# Patient Record
Sex: MALE | Race: WHITE | NOT HISPANIC OR LATINO | Employment: FULL TIME | URBAN - METROPOLITAN AREA
[De-identification: names, ages, dates, MRNs, and addresses within clinical notes are randomized per-mention and may not be internally consistent; named-entity substitution may affect disease eponyms.]

---
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
## 2021-05-29 ENCOUNTER — RECORDS - HEALTHEAST (OUTPATIENT)
Dept: ADMINISTRATIVE | Facility: CLINIC | Age: 21
End: 2021-05-29

## 2022-08-23 NOTE — PROGRESS NOTES
Update Note:    Notified by nursing staff that mother was concerned that the swelling and redness was worse during and after bursa aspiration today with orthopedics. Juan M rates the pain as 3-4 and endorses some sweating/chills intermittently today.    He remains afebrile today.    Examination of left arm as below. Swelling extending along the forearm with faint overlying erythema extending beyond the ED line of demarcation to the new demarcated line. Range of motion is between 15 degrees and 145 degrees (similar to this morning). Notable warmth over elbow.    Gram stain from aspirated fluid revealed gram positive cocci in clusters.    Plan  1. Will continue IV vancomycin for today as this has great gram positive coverage, including MRSA.  2. Will continue assessing left arm clinically for signs of progressing erythema/swelling or improvement. It is likely the change in swelling is positional and secondary to the infection settling.        Goldie Bryson MD  [facility], PGY-1  Pager: [phone]   For information on Fall & Injury Prevention, visit: [email or web address] OR  [email or web address] OR  https://www.cdc.gov/steadi/patient.html

## 2023-08-21 ENCOUNTER — HOSPITAL ENCOUNTER (EMERGENCY)
Facility: CLINIC | Age: 23
Discharge: HOME | End: 2023-08-21
Attending: EMERGENCY MEDICINE | Admitting: EMERGENCY MEDICINE
Payer: COMMERCIAL

## 2023-08-21 VITALS
HEIGHT: 71 IN | OXYGEN SATURATION: 99 % | HEART RATE: 52 BPM | DIASTOLIC BLOOD PRESSURE: 93 MMHG | TEMPERATURE: 98.1 F | BODY MASS INDEX: 25.2 KG/M2 | RESPIRATION RATE: 17 BRPM | WEIGHT: 180 LBS | SYSTOLIC BLOOD PRESSURE: 143 MMHG

## 2023-08-21 DIAGNOSIS — K29.00 ACUTE GASTRITIS WITHOUT HEMORRHAGE, UNSPECIFIED GASTRITIS TYPE: ICD-10-CM

## 2023-08-21 DIAGNOSIS — R10.13 ABDOMINAL PAIN, EPIGASTRIC: ICD-10-CM

## 2023-08-21 LAB
ANION GAP SERPL CALCULATED.3IONS-SCNC: 11 MMOL/L (ref 7–15)
BASOPHILS # BLD AUTO: 0.1 10E3/UL (ref 0–0.2)
BASOPHILS NFR BLD AUTO: 1 %
BUN SERPL-MCNC: 20.5 MG/DL (ref 6–20)
CALCIUM SERPL-MCNC: 9.9 MG/DL (ref 8.6–10)
CHLORIDE SERPL-SCNC: 102 MMOL/L (ref 98–107)
CREAT SERPL-MCNC: 1.09 MG/DL (ref 0.67–1.17)
DEPRECATED HCO3 PLAS-SCNC: 26 MMOL/L (ref 22–29)
EOSINOPHIL # BLD AUTO: 0.3 10E3/UL (ref 0–0.7)
EOSINOPHIL NFR BLD AUTO: 3 %
ERYTHROCYTE [DISTWIDTH] IN BLOOD BY AUTOMATED COUNT: 12.2 % (ref 10–15)
GFR SERPL CREATININE-BSD FRML MDRD: >90 ML/MIN/1.73M2
GLUCOSE SERPL-MCNC: 106 MG/DL (ref 70–99)
HCT VFR BLD AUTO: 43.2 % (ref 40–53)
HGB BLD-MCNC: 15.3 G/DL (ref 13.3–17.7)
HOLD SPECIMEN: NORMAL
IMM GRANULOCYTES # BLD: 0 10E3/UL
IMM GRANULOCYTES NFR BLD: 0 %
LYMPHOCYTES # BLD AUTO: 3.1 10E3/UL (ref 0.8–5.3)
LYMPHOCYTES NFR BLD AUTO: 27 %
MCH RBC QN AUTO: 28.6 PG (ref 26.5–33)
MCHC RBC AUTO-ENTMCNC: 35.4 G/DL (ref 31.5–36.5)
MCV RBC AUTO: 81 FL (ref 78–100)
MONOCYTES # BLD AUTO: 0.8 10E3/UL (ref 0–1.3)
MONOCYTES NFR BLD AUTO: 7 %
NEUTROPHILS # BLD AUTO: 7.3 10E3/UL (ref 1.6–8.3)
NEUTROPHILS NFR BLD AUTO: 62 %
NRBC # BLD AUTO: 0 10E3/UL
NRBC BLD AUTO-RTO: 0 /100
PLATELET # BLD AUTO: 271 10E3/UL (ref 150–450)
POTASSIUM SERPL-SCNC: 3.8 MMOL/L (ref 3.4–5.3)
RBC # BLD AUTO: 5.35 10E6/UL (ref 4.4–5.9)
SODIUM SERPL-SCNC: 139 MMOL/L (ref 136–145)
TROPONIN T SERPL HS-MCNC: <6 NG/L
WBC # BLD AUTO: 11.6 10E3/UL (ref 4–11)

## 2023-08-21 PROCEDURE — 85025 COMPLETE CBC W/AUTO DIFF WBC: CPT | Performed by: EMERGENCY MEDICINE

## 2023-08-21 PROCEDURE — 99284 EMERGENCY DEPT VISIT MOD MDM: CPT | Performed by: EMERGENCY MEDICINE

## 2023-08-21 PROCEDURE — 36415 COLL VENOUS BLD VENIPUNCTURE: CPT | Performed by: EMERGENCY MEDICINE

## 2023-08-21 PROCEDURE — 93005 ELECTROCARDIOGRAM TRACING: CPT | Performed by: EMERGENCY MEDICINE

## 2023-08-21 PROCEDURE — 250N000013 HC RX MED GY IP 250 OP 250 PS 637: Performed by: EMERGENCY MEDICINE

## 2023-08-21 PROCEDURE — 84484 ASSAY OF TROPONIN QUANT: CPT | Performed by: EMERGENCY MEDICINE

## 2023-08-21 PROCEDURE — 93010 ELECTROCARDIOGRAM REPORT: CPT | Performed by: EMERGENCY MEDICINE

## 2023-08-21 PROCEDURE — 80048 BASIC METABOLIC PNL TOTAL CA: CPT | Performed by: EMERGENCY MEDICINE

## 2023-08-21 RX ORDER — MAGNESIUM HYDROXIDE/ALUMINUM HYDROXICE/SIMETHICONE 120; 1200; 1200 MG/30ML; MG/30ML; MG/30ML
30 SUSPENSION ORAL ONCE
Status: COMPLETED | OUTPATIENT
Start: 2023-08-21 | End: 2023-08-21

## 2023-08-21 RX ORDER — FAMOTIDINE 20 MG/1
40 TABLET, FILM COATED ORAL ONCE
Status: COMPLETED | OUTPATIENT
Start: 2023-08-21 | End: 2023-08-21

## 2023-08-21 RX ORDER — FAMOTIDINE 20 MG/1
40 TABLET, FILM COATED ORAL ONCE
Status: DISCONTINUED | OUTPATIENT
Start: 2023-08-21 | End: 2023-08-21

## 2023-08-21 RX ADMIN — FAMOTIDINE 40 MG: 20 TABLET, FILM COATED ORAL at 00:30

## 2023-08-21 RX ADMIN — ALUMINUM HYDROXIDE, MAGNESIUM HYDROXIDE, AND SIMETHICONE 30 ML: 200; 200; 20 SUSPENSION ORAL at 00:27

## 2023-08-21 ASSESSMENT — ACTIVITIES OF DAILY LIVING (ADL): ADLS_ACUITY_SCORE: 35

## 2023-08-21 NOTE — ED TRIAGE NOTES
Pt reports at approx. 2130 he started experiencing chest pain that he describes as burning. Pt reports some associated nausea. Pt reports pain radiates up into both sides of his jaw. No precipitating  or alleviating factors.      Triage Assessment       Row Name 08/21/23 0016       Triage Assessment (Adult)    Airway WDL WDL       Respiratory WDL    Respiratory WDL WDL       Skin Circulation/Temperature WDL    Skin Circulation/Temperature WDL WDL       Cardiac WDL    Cardiac WDL X;chest pain       Chest Pain Assessment    Chest Pain Location midsternal    Chest Pain Radiation jaw    Character burning    Duration 3 hours    Precipitating Factors unknown    Alleviating Factors nothing    Associated Signs/Symptoms bradycardia;nausea       Peripheral/Neurovascular WDL    Peripheral Neurovascular WDL WDL       Cognitive/Neuro/Behavioral WDL    Cognitive/Neuro/Behavioral WDL WDL       [place] Coma Scale    Best Eye Response 4-->(E4) spontaneous    Best Motor Response 6-->(M6) obeys commands    Best Verbal Response 5-->(V5) oriented    Juan M Coma Scale Score 15

## 2023-08-21 NOTE — ED PROVIDER NOTES
History     Chief Complaint   Patient presents with    Chest Pain     HPI  Kamar Pedro is a 23 year old male who presents for chest pain.  Symptoms started about 2.5 hours prior to arrival, burning pain in the middle of the chest radiating up to the jaw.  No shortness of breath or cough.  He had some nausea earlier but this is better now, no vomiting.  He felt slightly diaphoretic with the nausea but this is better as well.  He has never had anything like this before.  He tried Tums without improvement.  No abdominal pain, diarrhea, or bloody stool.  Denies any recent changes in diet or food or drink.    Allergies:  No Known Allergies    Problem List:    Patient Active Problem List    Diagnosis Date Noted    Septic bursitis of elbow, left 11/19/2018     Priority: Medium    Acne 07/11/2017     Priority: Medium    Mild intermittent asthma 07/22/2005     Priority: Medium     Overview:   See separate new careplan updated 7-24-09  Jakub Jaimes MD  7/25/2009       10:26 AM    Kamar Pedro                         YOB: 2000     Last 1 Encounter Wt Readings:  Date Wt  12/12/2008 70 lb 9.6 oz (32.024 kg) (94%)  Asthma Severity: mild - mod persistent  Symptom Triggers: exercise, URI and cold air    GREEN ZONE    The GREEN ZONE means take the following medicine(s) every day.  Controller Medicine(s)and Dose(s)    Albuterol inhaler, 2 puffs every 4-6 hours as needed for cough/wheeze  Flovent 110 mcg, 1 puff twice daily    YELLOW ZONE    The YELLOW ZONE means take these medications instead of your GREEN zone medications to help keep the asthma symptoms from getting worse  Reliever Medicine(s) and Dose(s)    Albuterol inhaler, 2 puffs every 4-6 hours as needed for cough/wheeze  Albuterol nebs, 2.5mg  (1 respule) q3-4 hrs  Flovent 110 mcg, 2 puffs twice daily    RED ZONE    The RED ZONE means start taking your RED ZONE medicine(s) AND call your Doctor NOW!  Reliever Medicine(s) and Dose(s)  should be seen    "   Allergic rhinitis 05/13/2005     Priority: Medium     Overview:   allergic rhinoconjuctivitis/mold allergy          Past Medical History:    Past Medical History:   Diagnosis Date    Asthma        Past Surgical History:    Past Surgical History:   Procedure Laterality Date    TONSILLECTOMY         Family History:    Family History   Problem Relation Age of Onset    No Known Problems Mother     No Known Problems Father     No Known Problems Brother        Social History:  Marital Status:  Single [1]  Social History     Tobacco Use    Smoking status: Never   Substance Use Topics    Alcohol use: No    Drug use: No        Medications:    albuterol (PROAIR HFA/PROVENTIL HFA/VENTOLIN HFA) 108 (90 Base) MCG/ACT inhaler  fluticasone (FLOVENT HFA) 110 MCG/ACT Inhaler  ISOtretinoin (MYORISAN) 40 MG capsule  oxyCODONE IR (ROXICODONE) 5 MG tablet  sulfamethoxazole-trimethoprim (BACTRIM DS/SEPTRA DS) 800-160 MG per tablet          Review of Systems    Physical Exam   BP: 133/84  Pulse: 54  Temp: 98.1  F (36.7  C)  Resp: 16  Height: 180.3 cm (5' 11\")  Weight: 81.6 kg (180 lb)  SpO2: 100 %      Physical Exam  Constitutional:       General: He is not in acute distress.     Appearance: He is well-developed. He is not diaphoretic.   HENT:      Head: Normocephalic and atraumatic.   Eyes:      General: No scleral icterus.  Cardiovascular:      Rate and Rhythm: Normal rate and regular rhythm.      Pulses:           Radial pulses are 2+ on the right side and 2+ on the left side.        Posterior tibial pulses are 2+ on the right side and 2+ on the left side.      Heart sounds: No murmur heard.  Pulmonary:      Effort: Pulmonary effort is normal.      Breath sounds: Normal breath sounds.   Musculoskeletal:      Cervical back: Normal range of motion and neck supple.      Right lower leg: No edema.      Left lower leg: No edema.   Skin:     General: Skin is warm and dry.      Findings: No rash.   Neurological:      Mental Status: He is " alert and oriented to person, place, and time.         ED Course                 Procedures              EKG Interpretation:      Interpreted by Tony Marin MD  Time reviewed: 0028  Symptoms at time of EKG: chest pain   Rhythm: normal sinus   Rate: normal  Axis: normal  Ectopy: none  Conduction: normal  ST Segments/ T Waves: No ST-T wave changes  Q Waves: none  Comparison to prior: Unchanged    Clinical Impression: normal EKG      Critical Care time:  none               Results for orders placed or performed during the hospital encounter of 08/21/23 (from the past 24 hour(s))   Basic metabolic panel   Result Value Ref Range    Sodium 139 136 - 145 mmol/L    Potassium 3.8 3.4 - 5.3 mmol/L    Chloride 102 98 - 107 mmol/L    Carbon Dioxide (CO2) 26 22 - 29 mmol/L    Anion Gap 11 7 - 15 mmol/L    Urea Nitrogen 20.5 (H) 6.0 - 20.0 mg/dL    Creatinine 1.09 0.67 - 1.17 mg/dL    Calcium 9.9 8.6 - 10.0 mg/dL    Glucose 106 (H) 70 - 99 mg/dL    GFR Estimate >90 >60 mL/min/1.73m2   CBC with Platelets & Differential    Narrative    The following orders were created for panel order CBC with Platelets & Differential.  Procedure                               Abnormality         Status                     ---------                               -----------         ------                     CBC with platelets and d...[[id number]]  Abnormal            Final result                 Please view results for these tests on the individual orders.   Troponin T, High Sensitivity   Result Value Ref Range    Troponin T, High Sensitivity <6 <=22 ng/L   CBC with platelets and differential   Result Value Ref Range    WBC Count 11.6 (H) 4.0 - 11.0 10e3/uL    RBC Count 5.35 4.40 - 5.90 10e6/uL    Hemoglobin 15.3 13.3 - 17.7 g/dL    Hematocrit 43.2 40.0 - 53.0 %    MCV 81 78 - 100 fL    MCH 28.6 26.5 - 33.0 pg    MCHC 35.4 31.5 - 36.5 g/dL    RDW 12.2 10.0 - 15.0 %    Platelet Count 271 150 - 450 10e3/uL    % Neutrophils 62 %    %  Lymphocytes 27 %    % Monocytes 7 %    % Eosinophils 3 %    % Basophils 1 %    % Immature Granulocytes 0 %    NRBCs per 100 WBC 0 <1 /100    Absolute Neutrophils 7.3 1.6 - 8.3 10e3/uL    Absolute Lymphocytes 3.1 0.8 - 5.3 10e3/uL    Absolute Monocytes 0.8 0.0 - 1.3 10e3/uL    Absolute Eosinophils 0.3 0.0 - 0.7 10e3/uL    Absolute Basophils 0.1 0.0 - 0.2 10e3/uL    Absolute Immature Granulocytes 0.0 <=0.4 10e3/uL    Absolute NRBCs 0.0 10e3/uL   Drew Draw    Narrative    The following orders were created for panel order Drew Draw.  Procedure                               Abnormality         Status                     ---------                               -----------         ------                     Extra Blue Top Tube[[id number]]                              In process                   Please view results for these tests on the individual orders.       Medications   alum & mag hydroxide-simethicone (MAALOX) suspension 30 mL (30 mLs Oral $Given 8/21/23 0027)   famotidine (PEPCID) tablet 40 mg (40 mg Oral $Given 8/21/23 0030)       Assessments & Plan (with Medical Decision Making)   23-year-old male presents with severe chest pain.  EKG sinus rhythm without signs of ischemia or dysrhythmia.  He is given Maalox and famotidine for symptoms.  Mild leukocytosis of 11.6.  Hemoglobin is normal.  Troponin is normal making ACS unlikely.  Electrolytes are within normal limits.  On recheck he is feeling much better, says his pain is essentially gone now.  Likely gastritis as a cause of his symptoms and he is safe to discharge home with instructions to take famotidine daily for the next 2 weeks, return if worse, otherwise get rechecked in clinic.  The patient is in agreement with this plan.    I have reviewed the nursing notes.    I have reviewed the findings, diagnosis, plan and need for follow up with the patient.           Medical Decision Making  The patient's presentation was of high complexity (an acute health  issue posing potential threat to life or bodily function).    The patient's evaluation involved:  ordering and/or review of 3+ test(s) in this encounter (see separate area of note for details)    The patient's management necessitated moderate risk (prescription drug management including medications given in the ED).        New Prescriptions    No medications on file       Final diagnoses:   Acute gastritis without hemorrhage, unspecified gastritis type   Abdominal pain, epigastric       8/21/2023   [facility] EMERGENCY DEPT       Tony Marin MD  08/21/23 0125

## 2023-08-21 NOTE — DISCHARGE INSTRUCTIONS
I believe your pain is likely related to inflammation in the stomach.  Try taking famotidine (Pepcid) 40 mg daily for the next 2 weeks.  If this helps you can stop taking the medicine and take it as needed or you can take it every day, it is very safe with low side effects.  If this is not helping and you are having more pain or difficulty breathing or repeated vomiting get rechecked.  I do think it is a good idea for you to get rechecked regardless in the next 2 to 3 weeks in clinic.